# Patient Record
Sex: FEMALE | Race: OTHER | Employment: PART TIME | URBAN - METROPOLITAN AREA
[De-identification: names, ages, dates, MRNs, and addresses within clinical notes are randomized per-mention and may not be internally consistent; named-entity substitution may affect disease eponyms.]

---

## 2024-04-20 ENCOUNTER — HOSPITAL ENCOUNTER (OUTPATIENT)
Dept: RADIOLOGY | Facility: HOSPITAL | Age: 44
Discharge: HOME | End: 2024-04-20
Payer: COMMERCIAL

## 2024-04-20 DIAGNOSIS — N63.12 MASS OF UPPER INNER QUADRANT OF RIGHT BREAST: ICD-10-CM

## 2024-04-20 PROCEDURE — C8908 MRI W/O FOL W/CONT, BREAST,: HCPCS

## 2024-04-20 PROCEDURE — G1004 CDSM NDSC: HCPCS

## 2024-04-20 PROCEDURE — C8937 CAD BREAST MRI: HCPCS

## 2024-04-20 RX ORDER — GADOBUTROL 604.72 MG/ML
5 INJECTION INTRAVENOUS
Status: DISCONTINUED | OUTPATIENT
Start: 2024-04-20 | End: 2024-04-21 | Stop reason: HOSPADM

## 2024-10-28 ENCOUNTER — RA CDI HCC (OUTPATIENT)
Dept: OTHER | Facility: HOSPITAL | Age: 44
End: 2024-10-28

## 2024-10-28 PROBLEM — U07.1 COVID-19 VIRUS INFECTION: Status: RESOLVED | Noted: 2020-11-04 | Resolved: 2024-10-28

## 2024-10-28 NOTE — PROGRESS NOTES
HCC coding opportunities       Chart reviewed, no opportunity found: CHART REVIEWED, NO OPPORTUNITY FOUND        Patients Insurance        Commercial Insurance: [employer]

## 2024-11-04 ENCOUNTER — OFFICE VISIT (OUTPATIENT)
Dept: FAMILY MEDICINE CLINIC | Facility: CLINIC | Age: 44
End: 2024-11-04
Payer: COMMERCIAL

## 2024-11-04 VITALS
OXYGEN SATURATION: 100 % | HEART RATE: 118 BPM | BODY MASS INDEX: 21.62 KG/M2 | SYSTOLIC BLOOD PRESSURE: 140 MMHG | HEIGHT: 63 IN | RESPIRATION RATE: 16 BRPM | DIASTOLIC BLOOD PRESSURE: 80 MMHG | TEMPERATURE: 98 F | WEIGHT: 122 LBS

## 2024-11-04 DIAGNOSIS — I10 ESSENTIAL HYPERTENSION, BENIGN: ICD-10-CM

## 2024-11-04 DIAGNOSIS — M62.838 MUSCLE SPASM: ICD-10-CM

## 2024-11-04 DIAGNOSIS — Z00.01 ENCOUNTER FOR GENERAL ADULT MEDICAL EXAMINATION WITH ABNORMAL FINDINGS: Primary | ICD-10-CM

## 2024-11-04 DIAGNOSIS — Z12.31 ENCOUNTER FOR SCREENING MAMMOGRAM FOR MALIGNANT NEOPLASM OF BREAST: ICD-10-CM

## 2024-11-04 PROCEDURE — 99396 PREV VISIT EST AGE 40-64: CPT | Performed by: FAMILY MEDICINE

## 2024-11-04 RX ORDER — LISINOPRIL 20 MG/1
20 TABLET ORAL DAILY
Qty: 90 TABLET | Refills: 3 | Status: SHIPPED | OUTPATIENT
Start: 2024-11-04

## 2024-11-04 NOTE — ASSESSMENT & PLAN NOTE
HTN/SUBOPTIMAL CONTROL: Poor control of Bp. I discussed with patient regarding the need of compliance with medications.     Exercise was encouraged as a change of life style modification.      The main goal of treatment is to decrease the risk of mortality and of cardiovascular and renal morbidity.  BP goal should be less than 130/80 mmHg in patients with renal disease, and less than 140/90 mmHg in all other patients.   will continue on ACE inhibitor. Report BP at Home and inform.        Orders:    lisinopril (ZESTRIL) 20 mg tablet; Take 1 tablet (20 mg total) by mouth daily

## 2024-11-04 NOTE — PROGRESS NOTES
Ambulatory Visit  Name: Laila Hampton      : 1980      MRN: [id number]  Encounter Provider: Roman Campbell MD  Encounter Date: 2024   Encounter department: [facility] CARE [facility]    Assessment & Plan  Encounter for general adult medical examination with abnormal findings  Laila is here for a physical exam. I found her without any distress. The patient has the following chronic conditions   Patient Active Problem List   Diagnosis    Essential hypertension, benign    Encounter for general adult medical examination with abnormal findings    Neck muscle spasm    Anxiety    Mixed hyperlipidemia    Hyperglycemia   .   I recommended exercising at least 3 1/2  hours per week and maintaining a healthy diet with low carbohydrates and saturated fat.    I gave her  information about lifestyle modification.    The patient understands the importance of an annual physical exam.       Orders:    CBC and differential; Future    Comprehensive metabolic panel; Future    Lipid Panel with Direct LDL reflex; Future    Essential hypertension, benign  HTN/SUBOPTIMAL CONTROL: Poor control of Bp. I discussed with patient regarding the need of compliance with medications.     Exercise was encouraged as a change of life style modification.      The main goal of treatment is to decrease the risk of mortality and of cardiovascular and renal morbidity.  BP goal should be less than 130/80 mmHg in patients with renal disease, and less than 140/90 mmHg in all other patients.   will continue on ACE inhibitor. Report BP at Home and inform.        Orders:    lisinopril (ZESTRIL) 20 mg tablet; Take 1 tablet (20 mg total) by mouth daily    Encounter for screening mammogram for malignant neoplasm of breast  Breast Health Monitoring:  - Previous MRI of both breasts completed in 2024  - Due to high breast density, MRI recommended over mammogram  - Plan: Schedule follow-up breast MRI after   ". Patient opted for Mammogram.  Orders:    Mammo screening bilateral w 3d and cad; Future    Muscle spasm     Upper Back Pain - Left Side:  - Musculoskeletal pain involving multiple muscle groups:  * Trapezius  * Levator scapulae  * Serratus  * Scalenes  * Splenius cervicis  * Splenius capitis  - Plan: Continue current management and monitoring  Orders:    Ambulatory Referral to Chiropractic; Future       History of Present Illness     HPI  Chief Complaint   Patient presents with    Physical Exam    Hypertension    Anxiety     Coming to MD office    44-year-old female presents for follow-up regarding breast imaging and upper back pain. Patient reports having undergone breast MRI in 2023. Discussion held regarding imaging options, with radiologist recommending MRI over mammogram due to high breast density to avoid diagnostic confusion. Patient expresses anxiety about health matters and prefers to keep medical information private. Reports compliance with blood pressure medication. Primary complaint of left-sided upper back pain.  History obtained from patient.    Review of Systems  Past Medical History:   Diagnosis Date    Anxiety     COVID-19 virus infection 2020    Hypertension     Leukopenia      Past Surgical History:   Procedure Laterality Date     SECTION      x2    US GUIDANCE BREAST BIOPSY RIGHT EACH ADDITIONAL Right 2024    US GUIDED BREAST BIOPSY RIGHT COMPLETE Right 2024           Objective     /80 (BP Location: Left arm, Patient Position: Sitting, Cuff Size: Standard)   Pulse (!) 118   Temp 98 °F (36.7 °C) (Tympanic)   Resp 16   Ht 5' 3\" (1.6 m)   Wt 55.3 kg (122 lb)   SpO2 100%   BMI 21.61 kg/m²     Physical Exam  Non distress, normal respiratory effort. Pulse is regular. Heart without murmurs.  Upper back spasm corresponding to the muscles left the patient, elevator of the scapula, iliocostalis cervicis and thoracic, scalenes posteriorly, serratus posterior " superior, escalators meddle, splenius Cervicis, Splenius capitis.    I have spent 35 minutes with Laila today in which greater than 50% of this time was spent in counseling/coordination of care regarding Diagnostic results, Prognosis, Risks and benefits of tx options, Counseling / Coordination of care, Reviewing / ordering tests, medicine, procedures.  Please note this time includes cumulative time on the day of encounter, including reviewing medical records and/or coordinating care among the patient's other specialists.

## 2024-11-04 NOTE — ASSESSMENT & PLAN NOTE
Laila is here for a physical exam. I found her without any distress. The patient has the following chronic conditions   Patient Active Problem List   Diagnosis    Essential hypertension, benign    Encounter for general adult medical examination with abnormal findings    Neck muscle spasm    Anxiety    Mixed hyperlipidemia    Hyperglycemia   .   I recommended exercising at least 3 1/2  hours per week and maintaining a healthy diet with low carbohydrates and saturated fat.    I gave her  information about lifestyle modification.    The patient understands the importance of an annual physical exam.       Orders:    CBC and differential; Future    Comprehensive metabolic panel; Future    Lipid Panel with Direct LDL reflex; Future

## 2024-11-17 ENCOUNTER — RESULTS FOLLOW-UP (OUTPATIENT)
Dept: FAMILY MEDICINE CLINIC | Facility: CLINIC | Age: 44
End: 2024-11-17

## 2024-11-17 LAB
ALBUMIN SERPL-MCNC: 4.6 G/DL (ref 3.6–5.1)
ALBUMIN/GLOB SERPL: 1.5 (CALC) (ref 1–2.5)
ALP SERPL-CCNC: 64 U/L (ref 31–125)
ALT SERPL-CCNC: 15 U/L (ref 6–29)
AST SERPL-CCNC: 17 U/L (ref 10–30)
BASOPHILS # BLD AUTO: 50 CELLS/UL (ref 0–200)
BASOPHILS NFR BLD AUTO: 0.9 %
BILIRUB SERPL-MCNC: 0.6 MG/DL (ref 0.2–1.2)
BUN SERPL-MCNC: 14 MG/DL (ref 7–25)
BUN/CREAT SERPL: ABNORMAL (CALC) (ref 6–22)
CALCIUM SERPL-MCNC: 9.6 MG/DL (ref 8.6–10.2)
CHLORIDE SERPL-SCNC: 105 MMOL/L (ref 98–110)
CHOLEST SERPL-MCNC: 168 MG/DL
CHOLEST/HDLC SERPL: 2 (CALC)
CO2 SERPL-SCNC: 26 MMOL/L (ref 20–32)
CREAT SERPL-MCNC: 0.7 MG/DL (ref 0.5–0.99)
EOSINOPHIL # BLD AUTO: 121 CELLS/UL (ref 15–500)
EOSINOPHIL NFR BLD AUTO: 2.2 %
ERYTHROCYTE [DISTWIDTH] IN BLOOD BY AUTOMATED COUNT: 12.3 % (ref 11–15)
GFR/BSA.PRED SERPLBLD CYS-BASED-ARV: 109 ML/MIN/1.73M2
GLOBULIN SER CALC-MCNC: 3.1 G/DL (CALC) (ref 1.9–3.7)
GLUCOSE SERPL-MCNC: 83 MG/DL (ref 65–99)
HCT VFR BLD AUTO: 41.2 % (ref 35–45)
HDLC SERPL-MCNC: 83 MG/DL
HGB BLD-MCNC: 13.5 G/DL (ref 11.7–15.5)
LDLC SERPL CALC-MCNC: 77 MG/DL (CALC)
LYMPHOCYTES # BLD AUTO: 1144 CELLS/UL (ref 850–3900)
LYMPHOCYTES NFR BLD AUTO: 20.8 %
MCH RBC QN AUTO: 30.5 PG (ref 27–33)
MCHC RBC AUTO-ENTMCNC: 32.8 G/DL (ref 32–36)
MCV RBC AUTO: 93.2 FL (ref 80–100)
MONOCYTES # BLD AUTO: 539 CELLS/UL (ref 200–950)
MONOCYTES NFR BLD AUTO: 9.8 %
NEUTROPHILS # BLD AUTO: 3647 CELLS/UL (ref 1500–7800)
NEUTROPHILS NFR BLD AUTO: 66.3 %
NONHDLC SERPL-MCNC: 85 MG/DL (CALC)
PLATELET # BLD AUTO: 193 THOUSAND/UL (ref 140–400)
PMV BLD REES-ECKER: 13 FL (ref 7.5–12.5)
POTASSIUM SERPL-SCNC: 5.4 MMOL/L (ref 3.5–5.3)
PROT SERPL-MCNC: 7.7 G/DL (ref 6.1–8.1)
RBC # BLD AUTO: 4.42 MILLION/UL (ref 3.8–5.1)
SODIUM SERPL-SCNC: 136 MMOL/L (ref 135–146)
TRIGL SERPL-MCNC: 27 MG/DL
WBC # BLD AUTO: 5.5 THOUSAND/UL (ref 3.8–10.8)

## 2024-11-17 NOTE — RESULT ENCOUNTER NOTE
Dear Laila, the labs showed that you have increased the amount of low calcium.  Rest of the labs are normal.  Try to decrease the food containing potassium.  Irritant list of the most common food:  Bananas, oranges, cantaloupe, honeydew, apricots, grapefruit (some dried fruits, such as prunes, raisins, and dates, are also high in potassium)  Cooked spinach.  Cooked broccoli.  Potatoes.  Sweet potatoes.  Mushrooms.  Peas.  Cucumbers.    Rest of labs are normal

## 2025-04-26 ENCOUNTER — HOSPITAL ENCOUNTER (OUTPATIENT)
Dept: MAMMOGRAPHY | Facility: MEDICAL CENTER | Age: 45
Discharge: HOME | End: 2025-04-26
Payer: COMMERCIAL

## 2025-04-26 VITALS — HEIGHT: 63 IN | WEIGHT: 122 LBS | BODY MASS INDEX: 21.62 KG/M2

## 2025-04-26 DIAGNOSIS — Z12.31 ENCOUNTER FOR SCREENING MAMMOGRAM FOR MALIGNANT NEOPLASM OF BREAST: ICD-10-CM

## 2025-04-26 PROCEDURE — 77067 SCR MAMMO BI INCL CAD: CPT

## 2025-04-26 PROCEDURE — 77063 BREAST TOMOSYNTHESIS BI: CPT

## 2025-05-02 ENCOUNTER — RESULTS FOLLOW-UP (OUTPATIENT)
Dept: FAMILY MEDICINE CLINIC | Facility: CLINIC | Age: 45
End: 2025-05-02